# Patient Record
Sex: FEMALE | Race: BLACK OR AFRICAN AMERICAN | NOT HISPANIC OR LATINO | URBAN - METROPOLITAN AREA
[De-identification: names, ages, dates, MRNs, and addresses within clinical notes are randomized per-mention and may not be internally consistent; named-entity substitution may affect disease eponyms.]

---

## 2020-07-20 ENCOUNTER — OFFICE VISIT (OUTPATIENT)
Dept: URBAN - METROPOLITAN AREA TELEHEALTH 2 | Facility: TELEHEALTH | Age: 75
End: 2020-07-20
Payer: COMMERCIAL

## 2020-07-20 DIAGNOSIS — R10.11 ABDOMINAL PAIN: ICD-10-CM

## 2020-07-20 DIAGNOSIS — K21.9 GERD (GASTROESOPHAGEAL REFLUX DISEASE): ICD-10-CM

## 2020-07-20 DIAGNOSIS — K42.9 UMBILICAL HERNIA: ICD-10-CM

## 2020-07-20 DIAGNOSIS — Z12.11 COLON CANCER SCREENING: ICD-10-CM

## 2020-07-20 PROCEDURE — 99214 OFFICE O/P EST MOD 30 MIN: CPT | Performed by: INTERNAL MEDICINE

## 2020-07-20 RX ORDER — IRBESARTAN 150 MG/1
TABLET ORAL
Qty: 0 | Refills: 0 | Status: ACTIVE | COMMUNITY
Start: 1900-01-01

## 2020-07-20 RX ORDER — ASPIRIN 81 MG/1
CHEW 1 TABLET (81 MG) BY ORAL ROUTE ONCE DAILY TABLET, CHEWABLE ORAL 1
Qty: 0 | Refills: 0 | Status: ACTIVE | COMMUNITY
Start: 1900-01-01

## 2020-07-20 RX ORDER — SODIUM, POTASSIUM,MAG SULFATES 17.5-3.13G
177 ML SOLUTION, RECONSTITUTED, ORAL ORAL
Qty: 177 MILLIMETER | Refills: 0 | OUTPATIENT
Start: 2020-07-20 | End: 2020-07-21

## 2020-07-20 RX ORDER — BISOPROLOL FUMARATE 5 MG/1
TABLET, FILM COATED ORAL
Qty: 0 | Refills: 0 | Status: ACTIVE | COMMUNITY
Start: 1900-01-01

## 2020-07-20 RX ORDER — COLCHICINE 0.6 MG/1
TAKE 1 CAPSULE (0.6 MG) BY ORAL ROUTE ONCE DAILY CAPSULE ORAL 1
Qty: 0 | Refills: 0 | Status: ACTIVE | COMMUNITY
Start: 1900-01-01

## 2020-07-20 RX ORDER — AMLODIPINE BESYLATE 10 MG/1
TAKE 1 TABLET (10 MG) BY ORAL ROUTE ONCE DAILY TABLET ORAL 1
Qty: 0 | Refills: 0 | Status: ACTIVE | COMMUNITY
Start: 1900-01-01

## 2020-07-20 RX ORDER — PITAVASTATIN CALCIUM 2.09 MG/1
TABLET, FILM COATED ORAL
Qty: 0 | Refills: 0 | Status: ACTIVE | COMMUNITY
Start: 1900-01-01

## 2020-07-20 RX ORDER — FUROSEMIDE 20 MG/1
TABLET ORAL
Qty: 0 | Refills: 0 | Status: ACTIVE | COMMUNITY
Start: 1900-01-01

## 2020-07-20 NOTE — HPI-OTHER HISTORIES
This is a 75 yo female here for complaints of abdominal pain.  This has been ongoing for at least 3 weeks.  The pain is worse with meals, occurs 30 minutes after eating, rated an 8 and lasts for about 30 minutes.  She denies nausea, vomiting and constipation.   A CT of the abdomen without IV contrast June 26 revealed a small umbilical hernia but was otherwise unremarkable.  Patient has GERD symptoms which are well controlled with pantoprazole.  Patient is also here to schedule a colonoscopy.  In 2014 her colonoscopy revealed a hyperplastic colon polyp and diverticulosis.

## 2021-01-19 ENCOUNTER — TELEPHONE ENCOUNTER (OUTPATIENT)
Dept: URBAN - METROPOLITAN AREA CLINIC 17 | Facility: CLINIC | Age: 76
End: 2021-01-19

## 2021-01-19 RX ORDER — ASPIRIN 81 MG/1
CHEW 1 TABLET (81 MG) BY ORAL ROUTE ONCE DAILY TABLET, CHEWABLE ORAL 1
Qty: 0 | Refills: 0 | Status: ACTIVE | COMMUNITY
Start: 1900-01-01

## 2021-01-19 RX ORDER — IRBESARTAN 150 MG/1
TABLET ORAL
Qty: 0 | Refills: 0 | Status: ACTIVE | COMMUNITY
Start: 1900-01-01

## 2021-01-19 RX ORDER — COLCHICINE 0.6 MG/1
TAKE 1 CAPSULE (0.6 MG) BY ORAL ROUTE ONCE DAILY CAPSULE ORAL 1
Qty: 0 | Refills: 0 | Status: ACTIVE | COMMUNITY
Start: 1900-01-01

## 2021-01-19 RX ORDER — AMLODIPINE BESYLATE 10 MG/1
TAKE 1 TABLET (10 MG) BY ORAL ROUTE ONCE DAILY TABLET ORAL 1
Qty: 0 | Refills: 0 | Status: ACTIVE | COMMUNITY
Start: 1900-01-01

## 2021-01-19 RX ORDER — PITAVASTATIN CALCIUM 2.09 MG/1
TABLET, FILM COATED ORAL
Qty: 0 | Refills: 0 | Status: ACTIVE | COMMUNITY
Start: 1900-01-01

## 2021-01-19 RX ORDER — FUROSEMIDE 20 MG/1
TABLET ORAL
Qty: 0 | Refills: 0 | Status: ACTIVE | COMMUNITY
Start: 1900-01-01

## 2021-01-19 RX ORDER — BISOPROLOL FUMARATE 5 MG/1
TABLET, FILM COATED ORAL
Qty: 0 | Refills: 0 | Status: ACTIVE | COMMUNITY
Start: 1900-01-01

## 2021-01-19 RX ORDER — PANTOPRAZOLE SODIUM 40 MG/1
1 TABLET TABLET, DELAYED RELEASE ORAL ONCE A DAY
Qty: 30 | Refills: 6 | OUTPATIENT
Start: 2021-01-19

## 2021-08-27 ENCOUNTER — ERX REFILL RESPONSE (OUTPATIENT)
Dept: URBAN - METROPOLITAN AREA CLINIC 17 | Facility: CLINIC | Age: 76
End: 2021-08-27

## 2021-08-27 RX ORDER — PANTOPRAZOLE SODIUM 40 MG/1
1 TABLET TABLET, DELAYED RELEASE ORAL ONCE A DAY
Qty: 30 | Refills: 6 | OUTPATIENT

## 2021-08-27 RX ORDER — PANTOPRAZOLE 40 MG/1
TAKE 1 TABLET BY MOUTH ONCE DAILY FOR 30 DAYS TABLET, DELAYED RELEASE ORAL
Qty: 30 TABLET | Refills: 1 | OUTPATIENT

## 2023-01-10 ENCOUNTER — OFFICE VISIT (OUTPATIENT)
Dept: URBAN - METROPOLITAN AREA CLINIC 17 | Facility: CLINIC | Age: 78
End: 2023-01-10
Payer: COMMERCIAL

## 2023-01-10 VITALS
DIASTOLIC BLOOD PRESSURE: 83 MMHG | SYSTOLIC BLOOD PRESSURE: 157 MMHG | HEART RATE: 54 BPM | WEIGHT: 189 LBS | HEIGHT: 65 IN | BODY MASS INDEX: 31.49 KG/M2

## 2023-01-10 DIAGNOSIS — Z12.11 COLON CANCER SCREENING: ICD-10-CM

## 2023-01-10 DIAGNOSIS — K59.01 CONSTIPATION: ICD-10-CM

## 2023-01-10 PROBLEM — 14760008 CONSTIPATION: Status: ACTIVE | Noted: 2023-01-10

## 2023-01-10 PROCEDURE — 99204 OFFICE O/P NEW MOD 45 MIN: CPT | Performed by: INTERNAL MEDICINE

## 2023-01-10 RX ORDER — AMLODIPINE BESYLATE 10 MG/1
TAKE 1 TABLET (10 MG) BY ORAL ROUTE ONCE DAILY TABLET ORAL 1
Qty: 0 | Refills: 0 | Status: ACTIVE | COMMUNITY
Start: 1900-01-01

## 2023-01-10 RX ORDER — IRBESARTAN 150 MG/1
TABLET ORAL
Qty: 0 | Refills: 0 | Status: ACTIVE | COMMUNITY
Start: 1900-01-01

## 2023-01-10 RX ORDER — POLYETHYLENE GLYCOL 3350, SODIUM CHLORIDE, SODIUM BICARBONATE, POTASSIUM CHLORIDE 420; 11.2; 5.72; 1.48 G/4L; G/4L; G/4L; G/4L
AS DIRECTED POWDER, FOR SOLUTION ORAL
OUTPATIENT
Start: 2023-01-10

## 2023-01-10 RX ORDER — MONTELUKAST SODIUM 10 MG/1
TAKE 1 TABLET BY MOUTH ONCE DAILY TABLET, FILM COATED ORAL
Qty: 30 EACH | Refills: 3 | Status: ACTIVE | COMMUNITY

## 2023-01-10 RX ORDER — BISOPROLOL FUMARATE 5 MG/1
TABLET, FILM COATED ORAL
Qty: 0 | Refills: 0 | Status: ACTIVE | COMMUNITY
Start: 1900-01-01

## 2023-01-10 RX ORDER — COLCHICINE 0.6 MG/1
TAKE 1 CAPSULE (0.6 MG) BY ORAL ROUTE ONCE DAILY CAPSULE ORAL 1
Qty: 0 | Refills: 0 | Status: ON HOLD | COMMUNITY
Start: 1900-01-01

## 2023-01-10 RX ORDER — FUROSEMIDE 20 MG/1
TABLET ORAL
Qty: 0 | Refills: 0 | Status: ON HOLD | COMMUNITY
Start: 1900-01-01

## 2023-01-10 RX ORDER — ASPIRIN 81 MG/1
CHEW 1 TABLET (81 MG) BY ORAL ROUTE ONCE DAILY TABLET, CHEWABLE ORAL 1
Qty: 0 | Refills: 0 | Status: ACTIVE | COMMUNITY
Start: 1900-01-01

## 2023-01-10 RX ORDER — LINACLOTIDE 145 UG/1
1 CAPSULE AT LEAST 30 MINUTES BEFORE THE FIRST MEAL CAPSULE, GELATIN COATED ORAL ONCE A DAY
Qty: 30 | Refills: 2 | OUTPATIENT
Start: 2023-01-10 | End: 2023-04-10

## 2023-01-10 RX ORDER — PANTOPRAZOLE 40 MG/1
TAKE 1 TABLET BY MOUTH ONCE DAILY FOR 30 DAYS TABLET, DELAYED RELEASE ORAL
Qty: 30 TABLET | Refills: 1 | Status: ACTIVE | COMMUNITY

## 2023-01-10 RX ORDER — DULOXETINE HYDROCHLORIDE 20 MG/1
TAKE 1 CAPSULE BY MOUTH AT BEDTIME FOR 1 WEEK THEN TWICE A DAY AS TOLERATED FOR PAIN, NUMBNESS AND TINGLING CAPSULE, DELAYED RELEASE ORAL
Qty: 60 EACH | Refills: 0 | Status: ACTIVE | COMMUNITY

## 2023-01-10 RX ORDER — PITAVASTATIN CALCIUM 2.09 MG/1
TABLET, FILM COATED ORAL
Qty: 0 | Refills: 0 | Status: ON HOLD | COMMUNITY
Start: 1900-01-01

## 2023-01-10 RX ORDER — FEBUXOSTAT 40 MG/1
TABLET, COATED ORAL
Qty: 90 TABLET | Status: ACTIVE | COMMUNITY

## 2023-01-10 NOTE — HPI-TODAY'S VISIT:
This is a 76 yo female referred by Dr. Alena Decker  for a colonoscopy.  A copy of this document will be sent to the referring provider. The patient denies abdominal pain, weight loss, rectal bleeding,  diarrhea, and a change in bowel habits.  There is no family history of colon cancer or colon polyps. The patient admits to constipation.  Symptoms are exacerbated with starches.  Linzess prescribed years ago was effective.

## 2023-01-10 NOTE — PHYSICAL EXAM RECTAL:
normal tone, no external hemorrhoids, no masses palpable, no red blood, Tenderness on TRIP, Internal hemorrhoids present

## 2023-01-17 ENCOUNTER — LAB OUTSIDE AN ENCOUNTER (OUTPATIENT)
Dept: URBAN - METROPOLITAN AREA CLINIC 17 | Facility: CLINIC | Age: 78
End: 2023-01-17

## 2023-02-27 PROBLEM — 275978004 COLON CANCER SCREENING: Status: ACTIVE | Noted: 2023-02-27

## 2023-04-07 ENCOUNTER — OFFICE VISIT (OUTPATIENT)
Dept: URBAN - METROPOLITAN AREA SURGERY CENTER 30 | Facility: SURGERY CENTER | Age: 78
End: 2023-04-07
Payer: COMMERCIAL

## 2023-04-07 ENCOUNTER — OFFICE VISIT (OUTPATIENT)
Dept: URBAN - METROPOLITAN AREA SURGERY CENTER 30 | Facility: SURGERY CENTER | Age: 78
End: 2023-04-07

## 2023-04-07 ENCOUNTER — CLAIMS CREATED FROM THE CLAIM WINDOW (OUTPATIENT)
Dept: URBAN - METROPOLITAN AREA CLINIC 4 | Facility: CLINIC | Age: 78
End: 2023-04-07
Payer: COMMERCIAL

## 2023-04-07 DIAGNOSIS — K63.5 BENIGN COLON POLYP: ICD-10-CM

## 2023-04-07 DIAGNOSIS — Z12.11 COLON CANCER SCREENING: ICD-10-CM

## 2023-04-07 DIAGNOSIS — K63.89 OTHER SPECIFIED DISEASES OF INTESTINE: ICD-10-CM

## 2023-04-07 PROCEDURE — 88305 TISSUE EXAM BY PATHOLOGIST: CPT | Performed by: PATHOLOGY

## 2023-04-07 PROCEDURE — G8907 PT DOC NO EVENTS ON DISCHARG: HCPCS | Performed by: INTERNAL MEDICINE

## 2023-04-07 PROCEDURE — 45385 COLONOSCOPY W/LESION REMOVAL: CPT | Performed by: INTERNAL MEDICINE

## 2023-07-10 ENCOUNTER — TELEPHONE ENCOUNTER (OUTPATIENT)
Dept: URBAN - METROPOLITAN AREA CLINIC 17 | Facility: CLINIC | Age: 78
End: 2023-07-10

## 2023-07-10 RX ORDER — PANTOPRAZOLE 40 MG/1
TAKE 1 TABLET BY MOUTH ONCE DAILY FOR 30 DAYS TABLET, DELAYED RELEASE ORAL
Qty: 30 | Refills: 6

## 2023-08-23 ENCOUNTER — TELEPHONE ENCOUNTER (OUTPATIENT)
Dept: URBAN - METROPOLITAN AREA CLINIC 17 | Facility: CLINIC | Age: 78
End: 2023-08-23

## 2023-08-23 RX ORDER — FAMOTIDINE 40 MG/1
1 TABLET AT BEDTIME TABLET, FILM COATED ORAL ONCE A DAY
Qty: 30 | Refills: 0 | OUTPATIENT
Start: 2023-08-25

## 2023-09-18 ENCOUNTER — DASHBOARD ENCOUNTERS (OUTPATIENT)
Age: 78
End: 2023-09-18

## 2023-09-19 ENCOUNTER — OFFICE VISIT (OUTPATIENT)
Dept: URBAN - METROPOLITAN AREA CLINIC 17 | Facility: CLINIC | Age: 78
End: 2023-09-19

## 2023-09-19 ENCOUNTER — LAB OUTSIDE AN ENCOUNTER (OUTPATIENT)
Dept: URBAN - METROPOLITAN AREA CLINIC 17 | Facility: CLINIC | Age: 78
End: 2023-09-19

## 2023-09-19 ENCOUNTER — OFFICE VISIT (OUTPATIENT)
Dept: URBAN - METROPOLITAN AREA CLINIC 17 | Facility: CLINIC | Age: 78
End: 2023-09-19
Payer: COMMERCIAL

## 2023-09-19 VITALS
SYSTOLIC BLOOD PRESSURE: 156 MMHG | HEIGHT: 65 IN | WEIGHT: 184 LBS | HEART RATE: 57 BPM | BODY MASS INDEX: 30.66 KG/M2 | DIASTOLIC BLOOD PRESSURE: 98 MMHG | TEMPERATURE: 97.1 F

## 2023-09-19 DIAGNOSIS — Z86.19 HISTORY OF HELICOBACTER PYLORI INFECTION: ICD-10-CM

## 2023-09-19 DIAGNOSIS — R13.19 ESOPHAGEAL DYSPHAGIA: ICD-10-CM

## 2023-09-19 DIAGNOSIS — R63.4 WEIGHT LOSS: ICD-10-CM

## 2023-09-19 DIAGNOSIS — R68.81 EARLY SATIETY: ICD-10-CM

## 2023-09-19 PROCEDURE — 91065 BREATH HYDROGEN/METHANE TEST: CPT | Performed by: INTERNAL MEDICINE

## 2023-09-19 PROCEDURE — 99214 OFFICE O/P EST MOD 30 MIN: CPT | Performed by: INTERNAL MEDICINE

## 2023-09-19 RX ORDER — FAMOTIDINE 40 MG/1
1 TABLET AT BEDTIME TABLET, FILM COATED ORAL ONCE A DAY
Qty: 30 | Refills: 0 | Status: ACTIVE | COMMUNITY
Start: 2023-08-25

## 2023-09-19 RX ORDER — FEBUXOSTAT 40 MG/1
TABLET, COATED ORAL
Qty: 90 TABLET | Status: ACTIVE | COMMUNITY

## 2023-09-19 RX ORDER — FUROSEMIDE 20 MG/1
TABLET ORAL
Qty: 0 | Refills: 0 | COMMUNITY
Start: 1900-01-01

## 2023-09-19 RX ORDER — PITAVASTATIN CALCIUM 2.09 MG/1
TABLET, FILM COATED ORAL
Qty: 0 | Refills: 0 | COMMUNITY
Start: 1900-01-01

## 2023-09-19 RX ORDER — ASPIRIN 81 MG/1
CHEW 1 TABLET (81 MG) BY ORAL ROUTE ONCE DAILY TABLET, CHEWABLE ORAL 1
Qty: 0 | Refills: 0 | Status: ACTIVE | COMMUNITY
Start: 1900-01-01

## 2023-09-19 RX ORDER — AMLODIPINE BESYLATE 10 MG/1
TAKE 1 TABLET (10 MG) BY ORAL ROUTE ONCE DAILY TABLET ORAL 1
Qty: 0 | Refills: 0 | COMMUNITY
Start: 1900-01-01

## 2023-09-19 RX ORDER — ASPIRIN 81 MG/1
CHEW 1 TABLET (81 MG) BY ORAL ROUTE ONCE DAILY TABLET, CHEWABLE ORAL 1
Qty: 0 | Refills: 0 | COMMUNITY
Start: 1900-01-01

## 2023-09-19 RX ORDER — AMLODIPINE BESYLATE 10 MG/1
TAKE 1 TABLET (10 MG) BY ORAL ROUTE ONCE DAILY TABLET ORAL 1
Qty: 0 | Refills: 0 | Status: ACTIVE | COMMUNITY
Start: 1900-01-01

## 2023-09-19 RX ORDER — COLCHICINE 0.6 MG/1
TAKE 1 CAPSULE (0.6 MG) BY ORAL ROUTE ONCE DAILY CAPSULE ORAL 1
Qty: 0 | Refills: 0 | Status: ON HOLD | COMMUNITY
Start: 1900-01-01

## 2023-09-19 RX ORDER — FAMOTIDINE 40 MG/1
1 TABLET AT BEDTIME TABLET, FILM COATED ORAL ONCE A DAY
Qty: 30 | Refills: 0 | COMMUNITY
Start: 2023-08-25

## 2023-09-19 RX ORDER — IRBESARTAN 150 MG/1
TABLET ORAL
Qty: 0 | Refills: 0 | COMMUNITY
Start: 1900-01-01

## 2023-09-19 RX ORDER — PANTOPRAZOLE 40 MG/1
TAKE 1 TABLET BY MOUTH ONCE DAILY FOR 30 DAYS TABLET, DELAYED RELEASE ORAL
Qty: 30 | Refills: 6 | Status: ACTIVE | COMMUNITY

## 2023-09-19 RX ORDER — DULOXETINE HYDROCHLORIDE 20 MG/1
TAKE 1 CAPSULE BY MOUTH AT BEDTIME FOR 1 WEEK THEN TWICE A DAY AS TOLERATED FOR PAIN, NUMBNESS AND TINGLING CAPSULE, DELAYED RELEASE ORAL
Qty: 60 EACH | Refills: 0 | Status: ACTIVE | COMMUNITY

## 2023-09-19 RX ORDER — BISOPROLOL FUMARATE 5 MG/1
TABLET, FILM COATED ORAL
Qty: 0 | Refills: 0 | Status: ACTIVE | COMMUNITY
Start: 1900-01-01

## 2023-09-19 RX ORDER — FUROSEMIDE 20 MG/1
TABLET ORAL
Qty: 0 | Refills: 0 | Status: ON HOLD | COMMUNITY
Start: 1900-01-01

## 2023-09-19 RX ORDER — COLCHICINE 0.6 MG/1
TAKE 1 CAPSULE (0.6 MG) BY ORAL ROUTE ONCE DAILY CAPSULE ORAL 1
Qty: 0 | Refills: 0 | COMMUNITY
Start: 1900-01-01

## 2023-09-19 RX ORDER — PITAVASTATIN CALCIUM 2.09 MG/1
TABLET, FILM COATED ORAL
Qty: 0 | Refills: 0 | Status: ON HOLD | COMMUNITY
Start: 1900-01-01

## 2023-09-19 RX ORDER — MONTELUKAST SODIUM 10 MG/1
TAKE 1 TABLET BY MOUTH ONCE DAILY TABLET, FILM COATED ORAL
Qty: 30 EACH | Refills: 3 | COMMUNITY

## 2023-09-19 RX ORDER — DULOXETINE HYDROCHLORIDE 20 MG/1
TAKE 1 CAPSULE BY MOUTH AT BEDTIME FOR 1 WEEK THEN TWICE A DAY AS TOLERATED FOR PAIN, NUMBNESS AND TINGLING CAPSULE, DELAYED RELEASE ORAL
Qty: 60 EACH | Refills: 0 | COMMUNITY

## 2023-09-19 RX ORDER — PANTOPRAZOLE 40 MG/1
TAKE 1 TABLET BY MOUTH ONCE DAILY FOR 30 DAYS TABLET, DELAYED RELEASE ORAL
Qty: 30 | Refills: 6 | COMMUNITY

## 2023-09-19 RX ORDER — FEBUXOSTAT 40 MG/1
TABLET, COATED ORAL
Qty: 90 TABLET | COMMUNITY

## 2023-09-19 RX ORDER — MONTELUKAST SODIUM 10 MG/1
TAKE 1 TABLET BY MOUTH ONCE DAILY TABLET, FILM COATED ORAL
Qty: 30 EACH | Refills: 3 | Status: ACTIVE | COMMUNITY

## 2023-09-19 RX ORDER — IRBESARTAN 150 MG/1
TABLET ORAL
Qty: 0 | Refills: 0 | Status: ACTIVE | COMMUNITY
Start: 1900-01-01

## 2023-09-19 RX ORDER — BISOPROLOL FUMARATE 5 MG/1
TABLET, FILM COATED ORAL
Qty: 0 | Refills: 0 | COMMUNITY
Start: 1900-01-01

## 2023-09-19 NOTE — HPI-TODAY'S VISIT:
This is a 77 yo female here for problems swallowing.  She has a long standing history of GERD.  She developed dysphagia 2 months ago.  She has issues with feeing as if food gets stuck in her esophagus.  She has no issues with liquids.  She also complains of a sour stomach, feeling similar to when she was diagnosed with H plyori 2014.  She tested negative for the bacteria on EGD 2019.  She reports an unintentional 30 pound weight loss in one year and early satiety.  GERD symptoms controlled with famotidine.

## 2023-09-20 LAB
ALBUMIN/GLOBULIN RATIO: 1.4
ALBUMIN: 4.2
ALKALINE PHOSPHATASE: 75
ALT: 26
AST: 38
BILIRUBIN, TOTAL: 0.7
BUN/CREATININE RATIO: (no result)
CA 19-9: <3
CALCIUM: 10.7
CARBON DIOXIDE: 27
CHLORIDE: 106
CREATININE: 0.67
GLOBULIN: 2.9
GLUCOSE: 82
HEMATOCRIT: 39.6
HEMOGLOBIN: 13.3
MCH: 29.7
MCHC: 33.6
MCV: 88.4
MPV: 10.4
PLATELET COUNT: 223
POTASSIUM: 4.2
PROTEIN, TOTAL: 7.1
RDW: 13.2
RED BLOOD CELL COUNT: 4.48
SODIUM: 141
UREA NITROGEN (BUN): 12
WHITE BLOOD CELL COUNT: 5.1

## 2023-09-25 LAB
H PYLORI BREATH TEST: NOT DETECTED
H. PYLORI BREATH TEST: NOT DETECTED
INTERPRETATION: NOT DETECTED

## 2023-09-27 ENCOUNTER — TELEPHONE ENCOUNTER (OUTPATIENT)
Dept: URBAN - METROPOLITAN AREA CLINIC 17 | Facility: CLINIC | Age: 78
End: 2023-09-27

## 2023-09-27 RX ORDER — FAMOTIDINE 40 MG/1
1 TABLET AT BEDTIME TABLET, FILM COATED ORAL ONCE A DAY
Qty: 90 | Refills: 0
Start: 2023-08-25

## 2023-09-27 RX ORDER — LINACLOTIDE 145 UG/1
1 CAPSULE AT LEAST 30 MINUTES BEFORE THE FIRST MEAL OF THE DAY ON AN EMPTY STOMACH CAPSULE, GELATIN COATED ORAL ONCE A DAY
Qty: 90 | Refills: 3 | OUTPATIENT
Start: 2023-09-27 | End: 2024-09-21

## 2024-11-27 ENCOUNTER — TELEPHONE ENCOUNTER (OUTPATIENT)
Dept: URBAN - METROPOLITAN AREA CLINIC 105 | Facility: CLINIC | Age: 79
End: 2024-11-27

## 2024-11-27 RX ORDER — LINACLOTIDE 145 UG/1
1 CAPSULE AT LEAST 30 MINUTES BEFORE THE FIRST MEAL OF THE DAY ON AN EMPTY STOMACH CAPSULE, GELATIN COATED ORAL ONCE A DAY
Qty: 90 | Refills: 3 | OUTPATIENT
Start: 2024-11-27 | End: 2025-11-22

## 2025-06-05 ENCOUNTER — OFFICE VISIT (OUTPATIENT)
Dept: URBAN - METROPOLITAN AREA CLINIC 17 | Facility: CLINIC | Age: 80
End: 2025-06-05